# Patient Record
(demographics unavailable — no encounter records)

---

## 2025-03-04 NOTE — PHYSICAL EXAM
[de-identified] : Constitutional:  55 year old male, alert and oriented, cooperative, in no acute distress.  HEENT  NC/AT.  Appearance: symmetric  Chest/Respiratory  Respiratory effort: no intercostal retractions or use of accessory muscles. Nonlabored Breathing  Mental Status:  Judgment, insight: intact Orientation: oriented to time, place, and person  Left Knee  Inspection:     Skin intact, no rashes or lesions     No Effusion     Tenderness over the medial/lateral joint line  Range of Motion: 	Extension - -10 degrees 	Flexion - 110 degrees 	Alignment - Varus 7 degrees 	Extensor lag: None  Stability:      Demonstrates no Varus or Valgus instability      Negative Anterior or Posterior drawer.      Negative Lachman's  Patella: stable, tracks well.   Right Knee  Inspection:     Skin intact, no rashes or lesions     No Effusion     Non-tender to palpation over tibial tubercle, patella, medial and lateral joint line, and pes insertion.  Range of Motion: 	Extension - 0 degrees 	Flexion - 120 degrees 	Extensor lag: None  Stability:      Demonstrates no Varus or Valgus instability      Negative Anterior or Posterior drawer.      Negative Lachman's  Patella: stable, tracks well.   Neurologic Exam     Motor intact including 5/5 Extensor Hallucis Longus, 5/5 Flexor Hallucis Longus, 5/5 Tibialis Anterior and 5/5 Gastrocnemius     Sensation Intact to Light Touch including Saphenous, Sural, Superficial Peroneal, Deep Peroneal, Tibial nerve distributions  Vascular Exam     Foot is warm and well perfused with 2+ Dorsalis Pedis Pulse   No pain with range of motion of the bilateral hips. No lumbar paraspinal muscle tenderness. [de-identified] : XRay: XRays of the Right Knee (4 Views) taken in the office today and reviewed with the patient. XRays demonstrate tricompartmental joint space narrowing, with bone on bone articulations in the medial compartment, with subchondral sclerosis, overlying osteophytes, all consistent with severe osteoarthritis, KL rdGrdrrdarddrderd:rd rd3rd. There is varus alignment. (my personal interpretation)  XRay: XRays of the Left Knee (4 Views) taken in the office today and reviewed with the patient. XRays demonstrate tricompartmental joint space narrowing, with bone on bone articulations in the medial compartment, with subchondral sclerosis, overlying osteophytes, all consistent with severe osteoarthritis, KL rdGrdrrdarddrderd:rd rd3rd. There is varus alignment. (my personal interpretation)

## 2025-03-04 NOTE — HISTORY OF PRESENT ILLNESS
[de-identified] : Madi Juan is a 55-year-old male who presents to the office for evaluation of his bilateral knee pain.  Patient has been experiencing bilateral (left greater than right) knee pain for years.  Pain is located throughout the knees.  He has tried Aleve and Tylenol.  He has tried physical therapy, without relief.  He has not tried injections.  No falls.  No fevers or chills.  Pain is now affecting his quality of life and walking.  History: HTN

## 2025-03-04 NOTE — DISCUSSION/SUMMARY
[de-identified] : Madi Juan is a 55-year-old male who presents to the office for evaluation of his bilateral knee pain.  Patient has been experiencing bilateral (left greater than right) knee pain for years.  X-rays showed severe bilateral knee osteoarthritis.  Examination showed good bilateral knee range of motion. Discussed with patient the examination and imaging findings.  Discussed with patient the operative and nonoperative management of knee osteoarthritis, including total knee arthroplasty.  Discussed the nonoperative management of knee osteoarthritis, including physical therapy, anti-inflammatories, and injections.  Patient has tried nonoperative management, but continues to have knee pain.  Discussed total knee arthroplasty at length, including surgical procedure, hospital course, DVT prophylaxis, antibiotics, physical therapy, recovery, and risks. Patient would like to proceed with total knee arthroplasty.  Discussed that patient will require medical clearance prior to surgery. Discussed smoking cessation.  Discussed risks of total knee arthroplasty with smoking, including wound complications and infection. Discussed obtaining a CT scan prior to surgery.  Patient understanding and in agreement with the plan.  All questions answered.   Discussed the imaging and physical exam findings with the patient consistent with endstage knee degenerative disease. The patient has failed conservative management including physical therapy and injection. The risks, benefits and alternatives to total knee replacement were discussed with the patient in detail and the patient elected to proceed with surgery. Discussed the surgical plan with the patient including implant options and surgical approach.   Surgical risks including fracture requiring fixation, instability or dislocation, temporary or permanent leg length inequality, infection, bleeding, stiffness, failure to alleviate pain, failure to achieve desired results, need for further surgery, scar tissue formation, hardware failure, chronic pain, injury to nerves resulting in extremity dysfunction, injury to arteries and veins, deep vein thrombosis or pulmonary embolism requiring anticoagulation and medical risk factors including heart attack, stroke, death, neurological injury, pneumonia, kidney or other organ failure were discussed with the patient.   Patient was understanding and in agreement with the treatment plan. All questions answered.  Plan: -Smoking Cessation -CT Scan Left Lower Extremity -Medical Clearance -Follow up in 3 weeks for reevaluation and management -Left Total Knee Arthroplasty  Surgical Plan: Diagnosis: Left Knee Osteoarthritis Laterality: Left Operative procedure: Left Total Knee Arthroplasty Location: [facility]  DVT prophylaxis: Aspirin 81mg BID TXA: IV Plan for discharge: Home  Pre- & Post Operative Antibiotics: Cefazolin 2g  Clearances:      Medical: Pending  Comorbidities:      Metal Allergy: Negative      Chronic Pain: Negative      Diabetes: Negative      Use of Anticoagulation: Negative      Atrial Fibrillation: Negative      History of VTE: Negative      History of Cardiac Stents: Negative      Skin Infections/Open Wounds: Negative      MRSA Infection/Colonization: Negative      Current Urinary Symptoms: Negative      Immunocompromise: Negative      Inflammatory Arthritis: Negative      Smoking: Quitting (2x per day)      Drug Use: Occasional Marijuana      Alcohol Use: Occasional      Obstructive Sleep Apnea: Negative      Neurologic Disease: Negative

## 2025-05-21 NOTE — PHYSICAL EXAM
[de-identified] : Constitutional:  55 year old male, alert and oriented, cooperative, in no acute distress.  HEENT  NC/AT.  Appearance: symmetric  Neck/Back Straight without deformity or instability.  Good ROM.  Chest/Respiratory  Respiratory effort: no intercostal retractions or use of accessory muscles. Nonlabored Breathing  Skin  On inspection, warm and dry without rashes or lesions.  Mental Status:  Judgment, insight: intact Orientation: oriented to time, place, and person  Neurological: Sensory and Motor are grossly intact throughout  Left Knee  Inspection:     Incision well healed. No erythema or drainage     No Effusion     Non-tender to palpation over tibial tubercle, patella, medial and lateral joint line, and pes insertion.  Range of Motion: 	Extension - 0 degrees 	Flexion - 90 degrees 	Extensor lag: None  Stability:      Demonstrates no Varus or Valgus instability      Negative Anterior or Posterior drawer.      No mid flexion instability  Patella: stable, tracks well.   Neurologic Exam     Motor intact including 5/5 Extensor Hallucis Longus, 5/5 Flexor Hallucis Longus, 5/5 Tibialis Anterior and 5/5 Gastrocnemius     Sensation Intact to Light Touch including Saphenous, Sural, Superficial Peroneal, Deep Peroneal, Tibial nerve distributions  Vascular Exam     Foot is warm and well perfused with 2+ Dorsalis Pedis Pulse   No pain with range of motion of the bilateral hips or right knee. No lumbar paraspinal muscle tenderness. [de-identified] : XRay:  XRays of the Left Knee (3 Views) taken in the office today and reviewed with the patient. XRays demonstrate a Left Total Knee Arthroplasty in good position and alignment. There is no obvious evidence of fracture, dislocation, osteolysis or loosening. (my personal interpretation) Components: [employer] CS C[employer]

## 2025-05-21 NOTE — HISTORY OF PRESENT ILLNESS
[de-identified] : 5/21/2025  Madi Juan presents to the office for follow-up of his left TKA.  Patient is currently doing well overall.  He is currently in home physical therapy.  He is taking his aspirin.  3/4/2025 Madi Juan is a 55-year-old male who presents to the office for evaluation of his bilateral knee pain.  Patient has been experiencing bilateral (left greater than right) knee pain for years.  Pain is located throughout the knees.  He has tried Aleve and Tylenol.  He has tried physical therapy, without relief.  He has not tried injections.  No falls.  No fevers or chills.  Pain is now affecting his quality of life and walking.  History: HTN

## 2025-05-21 NOTE — DISCUSSION/SUMMARY
[de-identified] : Madi Juan is a 55-year-old male who presents to the office for follow-up of his left TKA. XRays showed Left total knee arthroplasty in good position and alignment. Examination showed range of motion 0 to 90. Discussed with the patient the examination and imaging findings. Discussed the management of total knee arthroplasty at this time, including physical therapy and DVT prophylaxis. Patient was given a referral to physical therapy. Patient will continued to take Aspirin twice daily for a total of 6 weeks for DVT prophylaxis. Dressing was removed. Discussed that patient may shower, but should not soak the wound. Patient will follow up in 4 weeks for reevaluation and management. Patient understanding and in agreement with the plan. All questions answered.   Plan: -Physical Therapy -DVT Prophylaxis: Aspirin twice daily for a total of 6 weeks -Patient may shower, but should not soak the wound -Follow up in 4 weeks for reevaluation and management

## 2025-05-21 NOTE — DISCUSSION/SUMMARY
[de-identified] : Madi Juan is a 55-year-old male who presents to the office for follow-up of his left TKA. XRays showed Left total knee arthroplasty in good position and alignment. Examination showed range of motion 0 to 90. Discussed with the patient the examination and imaging findings. Discussed the management of total knee arthroplasty at this time, including physical therapy and DVT prophylaxis. Patient was given a referral to physical therapy. Patient will continued to take Aspirin twice daily for a total of 6 weeks for DVT prophylaxis. Dressing was removed. Discussed that patient may shower, but should not soak the wound. Patient will follow up in 4 weeks for reevaluation and management. Patient understanding and in agreement with the plan. All questions answered.   Plan: -Physical Therapy -DVT Prophylaxis: Aspirin twice daily for a total of 6 weeks -Patient may shower, but should not soak the wound -Follow up in 4 weeks for reevaluation and management

## 2025-05-21 NOTE — HISTORY OF PRESENT ILLNESS
[de-identified] : 5/21/2025  Madi Juan presents to the office for follow-up of his left TKA.  Patient is currently doing well overall.  He is currently in home physical therapy.  He is taking his aspirin.  3/4/2025 Madi Juan is a 55-year-old male who presents to the office for evaluation of his bilateral knee pain.  Patient has been experiencing bilateral (left greater than right) knee pain for years.  Pain is located throughout the knees.  He has tried Aleve and Tylenol.  He has tried physical therapy, without relief.  He has not tried injections.  No falls.  No fevers or chills.  Pain is now affecting his quality of life and walking.  History: HTN

## 2025-05-21 NOTE — PHYSICAL EXAM
[de-identified] : Constitutional:  55 year old male, alert and oriented, cooperative, in no acute distress.  HEENT  NC/AT.  Appearance: symmetric  Neck/Back Straight without deformity or instability.  Good ROM.  Chest/Respiratory  Respiratory effort: no intercostal retractions or use of accessory muscles. Nonlabored Breathing  Skin  On inspection, warm and dry without rashes or lesions.  Mental Status:  Judgment, insight: intact Orientation: oriented to time, place, and person  Neurological: Sensory and Motor are grossly intact throughout  Left Knee  Inspection:     Incision well healed. No erythema or drainage     No Effusion     Non-tender to palpation over tibial tubercle, patella, medial and lateral joint line, and pes insertion.  Range of Motion: 	Extension - 0 degrees 	Flexion - 90 degrees 	Extensor lag: None  Stability:      Demonstrates no Varus or Valgus instability      Negative Anterior or Posterior drawer.      No mid flexion instability  Patella: stable, tracks well.   Neurologic Exam     Motor intact including 5/5 Extensor Hallucis Longus, 5/5 Flexor Hallucis Longus, 5/5 Tibialis Anterior and 5/5 Gastrocnemius     Sensation Intact to Light Touch including Saphenous, Sural, Superficial Peroneal, Deep Peroneal, Tibial nerve distributions  Vascular Exam     Foot is warm and well perfused with 2+ Dorsalis Pedis Pulse   No pain with range of motion of the bilateral hips or right knee. No lumbar paraspinal muscle tenderness. [de-identified] : XRay:  XRays of the Left Knee (3 Views) taken in the office today and reviewed with the patient. XRays demonstrate a Left Total Knee Arthroplasty in good position and alignment. There is no obvious evidence of fracture, dislocation, osteolysis or loosening. (my personal interpretation) Components: [employer] CS C[employer]

## 2025-07-01 NOTE — HISTORY OF PRESENT ILLNESS
[de-identified] : 7/1/2025  Madi Juan presents to the office for follow-up of his left TKA.  Patient is currently doing well overall.  He is currently in physical therapy.  He has completed his aspirin.  5/21/2025  Madi Juan presents to the office for follow-up of his left TKA.  Patient is currently doing well overall.  He is currently in home physical therapy.  He is taking his aspirin.  3/4/2025 Madi Juan is a 55-year-old male who presents to the office for evaluation of his bilateral knee pain.  Patient has been experiencing bilateral (left greater than right) knee pain for years.  Pain is located throughout the knees.  He has tried Aleve and Tylenol.  He has tried physical therapy, without relief.  He has not tried injections.  No falls.  No fevers or chills.  Pain is now affecting his quality of life and walking.  History: HTN

## 2025-07-01 NOTE — PHYSICAL EXAM
[de-identified] : Constitutional:  55 year old male, alert and oriented, cooperative, in no acute distress.  HEENT  NC/AT.  Appearance: symmetric  Neck/Back Straight without deformity or instability.  Good ROM.  Chest/Respiratory  Respiratory effort: no intercostal retractions or use of accessory muscles. Nonlabored Breathing  Skin  On inspection, warm and dry without rashes or lesions.  Mental Status:  Judgment, insight: intact Orientation: oriented to time, place, and person  Neurological: Sensory and Motor are grossly intact throughout  Left Knee  Inspection:     Incision well healed. No erythema or drainage     No Effusion     Non-tender to palpation over tibial tubercle, patella, medial and lateral joint line, and pes insertion.  Range of Motion: 	Extension - 0 degrees 	Flexion - 120 degrees 	Extensor lag: None  Stability:      Demonstrates no Varus or Valgus instability      Negative Anterior or Posterior drawer.      No mid flexion instability  Patella: stable, tracks well.   Neurologic Exam     Motor intact including 5/5 Extensor Hallucis Longus, 5/5 Flexor Hallucis Longus, 5/5 Tibialis Anterior and 5/5 Gastrocnemius     Sensation Intact to Light Touch including Saphenous, Sural, Superficial Peroneal, Deep Peroneal, Tibial nerve distributions  Vascular Exam     Foot is warm and well perfused with 2+ Dorsalis Pedis Pulse   No pain with range of motion of the bilateral hips or right knee. No lumbar paraspinal muscle tenderness. [de-identified] : XRay:  XRays of the Left Knee (3 Views) taken in the office today and reviewed with the patient. XRays demonstrate a Left Total Knee Arthroplasty in good position and alignment. There is no obvious evidence of fracture, dislocation, osteolysis or loosening. (my personal interpretation) Components: [employer] CS [employer]

## 2025-07-01 NOTE — DISCUSSION/SUMMARY
[de-identified] : Madi Juan is a 55-year-old male who presents to the office for follow-up of his left TKA. XRays showed Left total knee arthroplasty in good position and alignment. Examination showed range of motion 0 to 120.  Discussed with the patient the examination and imaging findings. Discussed the management of total knee arthroplasty at this time, including physical therapy. Patient will continue physical therapy. Patient has completed DVT prophylaxis. Patient will follow up in 6 weeks for reevaluation and management. Patient understanding and in agreement with the plan. All questions answered.     Plan: -Return to work: 9/2/2025 -Physical Therapy -DVT Prophylaxis: Completed -Follow up in 6 weeks for reevaluation and management